# Patient Record
Sex: MALE | Race: ASIAN | NOT HISPANIC OR LATINO | URBAN - METROPOLITAN AREA
[De-identification: names, ages, dates, MRNs, and addresses within clinical notes are randomized per-mention and may not be internally consistent; named-entity substitution may affect disease eponyms.]

---

## 2023-06-13 ENCOUNTER — EMERGENCY (EMERGENCY)
Facility: HOSPITAL | Age: 63
LOS: 1 days | Discharge: HOME | End: 2023-06-13
Attending: STUDENT IN AN ORGANIZED HEALTH CARE EDUCATION/TRAINING PROGRAM
Payer: MEDICAID

## 2023-06-13 VITALS
RESPIRATION RATE: 18 BRPM | TEMPERATURE: 98 F | HEIGHT: 64 IN | OXYGEN SATURATION: 97 % | SYSTOLIC BLOOD PRESSURE: 151 MMHG | WEIGHT: 156.97 LBS | DIASTOLIC BLOOD PRESSURE: 84 MMHG | HEART RATE: 77 BPM

## 2023-06-13 PROCEDURE — 73562 X-RAY EXAM OF KNEE 3: CPT | Mod: 26,LT

## 2023-06-13 PROCEDURE — 99283 EMERGENCY DEPT VISIT LOW MDM: CPT

## 2023-06-13 PROCEDURE — 99283 EMERGENCY DEPT VISIT LOW MDM: CPT | Mod: 25

## 2023-06-13 PROCEDURE — 73562 X-RAY EXAM OF KNEE 3: CPT

## 2023-06-13 RX ORDER — ACETAMINOPHEN 500 MG
975 TABLET ORAL ONCE
Refills: 0 | Status: COMPLETED | OUTPATIENT
Start: 2023-06-13 | End: 2023-06-13

## 2023-06-13 RX ORDER — IBUPROFEN 200 MG
400 TABLET ORAL ONCE
Refills: 0 | Status: COMPLETED | OUTPATIENT
Start: 2023-06-13 | End: 2023-06-13

## 2023-06-13 RX ADMIN — Medication 400 MILLIGRAM(S): at 16:42

## 2023-06-13 RX ADMIN — Medication 975 MILLIGRAM(S): at 16:41

## 2023-06-13 NOTE — ED PROVIDER NOTE - NSFOLLOWUPCLINICS_GEN_ALL_ED_FT
[facility]  Sports Medicine  [address] [place] [address]  Phone: [phone]  Fax:

## 2023-06-13 NOTE — ED PROVIDER NOTE - SHIFT CHANGE DETAILS
Attending MD Bradley: 63M w HTN, L knee pain x 10d, getting PT, today at PT, felt L knee strain and could not bear weight, presents now, good ROM at knee, if XR nonactionable, would dc with ACE wrap and cane or crutch

## 2023-06-13 NOTE — ED PROVIDER NOTE - CLINICAL SUMMARY MEDICAL DECISION MAKING FREE TEXT BOX
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 VSS. minimal posterolateral ttp/swelling to L knee. Ddx meniscus tear (given minimal swelling x10 days), bakers cyst, quadriceps/hamstring strain, fx. XR knee, pain meds, road test, likely tbdc - Zoran Barakat PA-C

## 2023-06-13 NOTE — ED PROVIDER NOTE - OBJECTIVE STATEMENT
63y m pmhx HTN p/w knee pain x10 days. pt reports 10 days of left knee pain that began while working "felt a tear" has been persistent but not worsening. no otc meds taken. able to ambulate with pain. denies prior injuries to that knee. Denies direct fall onto knee. Denies extremity numbness, tingling, weakness, swelling, warmth, redness, fever, chills.

## 2023-06-13 NOTE — ED PROVIDER NOTE - ATTENDING APP SHARED VISIT CONTRIBUTION OF CARE
63 M w/ PMH of HTN, presents to the ER w/ pain in the L knee, pt states that he was at PT felt a pulling sensation in the L knee and had worsening sx. pt reports that he has pain in the L knee for 10 days. pt w/ no fevers, no chills, pt w/ no weakness in lower leg,   ON exam, pt is awake and alert in no distress, clear lungs soft abdomen, no hip tenderness no pelvis instability. pt w/ intact flexion of knee and extension pt w/ 2+ DP pulse intact sensation in the b/l lower extremities, plan for imaging and reassessment knee sprain,

## 2023-06-13 NOTE — ED PROVIDER NOTE - PATIENT PORTAL LINK FT
You can access the FollowMyHealth Patient Portal offered by [facility] by registering at the following website: [email or web address]. By joining [facility]’s FollowMyHealth portal, you will also be able to view your health information using other applications (apps) compatible with our system.

## 2023-06-13 NOTE — ED PROVIDER NOTE - PROGRESS NOTE DETAILS
Attending MD Bradley: Patient re-evaluated and feeling improved.  No acute issues at  this time.  Radiology test reviewed with patient and family.  Ambulating freely with cane.  Patient stable for discharge.  Follow up instructions given, importance of follow up emphasized, return to ED parameters reviewed and patient verbalized understanding.  All questions answered, all concerns addressed.
PROVIDER:[TOKEN:[[id number]]]

## 2023-06-13 NOTE — ED PROVIDER NOTE - PHYSICAL EXAMINATION
Patient is well appearing adult male, NAD, awake, alert and oriented x 3no respiratory distress, Moving all extremities FROM, no edema or calf tenderness. Minimal posterolateral left knee ttp with minimal swelling, ROM intact FROM, no deformities/crepitus/warmth/erythema. Skin with no rash. Strength 5/5UE/LE. NmlSensation. Gait normal.

## 2023-06-13 NOTE — ED PROVIDER NOTE - NSFOLLOWUPINSTRUCTIONS_ED_ALL_ED_FT
Please follow up with your primary care doctor within 1 week.  Follow up with orthopedics within 1-2 weeks - see contact info    *Bring all printed lab/test results to your appointment(s).*    Take ibuprofen 400-600mg every 6 hrs as needed for pain. Take with food  Take acetaminophen 500-1000mg every 6 hrs as needed for pain. DO NOT EXCEED 4000mg DAILY.    Return to the ED for worsening pain, numbness, tingling, weakness, fevers, or any other concerns.